# Patient Record
Sex: FEMALE | Race: WHITE | NOT HISPANIC OR LATINO | Employment: UNEMPLOYED | URBAN - METROPOLITAN AREA
[De-identification: names, ages, dates, MRNs, and addresses within clinical notes are randomized per-mention and may not be internally consistent; named-entity substitution may affect disease eponyms.]

---

## 2022-11-18 ENCOUNTER — OFFICE VISIT (OUTPATIENT)
Dept: FAMILY MEDICINE | Facility: CLINIC | Age: 32
End: 2022-11-18
Payer: COMMERCIAL

## 2022-11-18 VITALS
BODY MASS INDEX: 20.25 KG/M2 | DIASTOLIC BLOOD PRESSURE: 74 MMHG | TEMPERATURE: 98.2 F | HEIGHT: 67 IN | HEART RATE: 80 BPM | SYSTOLIC BLOOD PRESSURE: 104 MMHG | WEIGHT: 129 LBS

## 2022-11-18 DIAGNOSIS — R10.32 LEFT GROIN PAIN: ICD-10-CM

## 2022-11-18 DIAGNOSIS — F43.9 STRESS: ICD-10-CM

## 2022-11-18 DIAGNOSIS — R53.83 TIRED: ICD-10-CM

## 2022-11-18 DIAGNOSIS — R10.2 PELVIC PAIN: Primary | ICD-10-CM

## 2022-11-18 DIAGNOSIS — K64.9 HEMORRHOIDS, UNSPECIFIED HEMORRHOID TYPE: ICD-10-CM

## 2022-11-18 LAB
BASOPHILS # BLD AUTO: 0 10E3/UL (ref 0–0.2)
BASOPHILS NFR BLD AUTO: 0 %
CLUE CELLS: ABNORMAL
EOSINOPHIL # BLD AUTO: 0.1 10E3/UL (ref 0–0.7)
EOSINOPHIL NFR BLD AUTO: 2 %
ERYTHROCYTE [DISTWIDTH] IN BLOOD BY AUTOMATED COUNT: 12.1 % (ref 10–15)
HCT VFR BLD AUTO: 39 % (ref 35–47)
HGB BLD-MCNC: 12.9 G/DL (ref 11.7–15.7)
IMM GRANULOCYTES # BLD: 0 10E3/UL
IMM GRANULOCYTES NFR BLD: 0 %
LYMPHOCYTES # BLD AUTO: 2.2 10E3/UL (ref 0.8–5.3)
LYMPHOCYTES NFR BLD AUTO: 32 %
MCH RBC QN AUTO: 29.1 PG (ref 26.5–33)
MCHC RBC AUTO-ENTMCNC: 33.1 G/DL (ref 31.5–36.5)
MCV RBC AUTO: 88 FL (ref 78–100)
MONOCYTES # BLD AUTO: 0.8 10E3/UL (ref 0–1.3)
MONOCYTES NFR BLD AUTO: 11 %
NEUTROPHILS # BLD AUTO: 3.7 10E3/UL (ref 1.6–8.3)
NEUTROPHILS NFR BLD AUTO: 54 %
PLATELET # BLD AUTO: 239 10E3/UL (ref 150–450)
RBC # BLD AUTO: 4.44 10E6/UL (ref 3.8–5.2)
TRICHOMONAS, WET PREP: ABNORMAL
TSH SERPL DL<=0.005 MIU/L-ACNC: 0.66 UIU/ML (ref 0.3–4.2)
WBC # BLD AUTO: 6.9 10E3/UL (ref 4–11)
WBC'S/HIGH POWER FIELD, WET PREP: ABNORMAL
YEAST, WET PREP: ABNORMAL

## 2022-11-18 PROCEDURE — 85025 COMPLETE CBC W/AUTO DIFF WBC: CPT | Performed by: NURSE PRACTITIONER

## 2022-11-18 PROCEDURE — 99204 OFFICE O/P NEW MOD 45 MIN: CPT | Performed by: NURSE PRACTITIONER

## 2022-11-18 PROCEDURE — 36415 COLL VENOUS BLD VENIPUNCTURE: CPT | Performed by: NURSE PRACTITIONER

## 2022-11-18 PROCEDURE — 87210 SMEAR WET MOUNT SALINE/INK: CPT | Performed by: NURSE PRACTITIONER

## 2022-11-18 PROCEDURE — 84443 ASSAY THYROID STIM HORMONE: CPT | Performed by: NURSE PRACTITIONER

## 2022-11-18 ASSESSMENT — ENCOUNTER SYMPTOMS
HEADACHES: 1
ABDOMINAL PAIN: 0
DIARRHEA: 1
HEMATOCHEZIA: 1
HEMATURIA: 1

## 2022-11-18 NOTE — PROGRESS NOTES
SUBJECTIVE:   CC: Janie is an 32 year old who presents to [facility] care. She is present related to left pelvic pain, left groin pain, and hemorrhoids.  She stated that the pelvic pain has been present for at least 1 to 2 months.  She denied any abnormal vaginal bleeding, vaginal odor, fevers, chills, nausea or vomiting.  She stated that she has been under more stress because she recently moved from [facility].  She denied lifting any heavy objects but the pain can be reproduced by lifting up her left leg.  She denied any concerns for sexually transmitted diseases.  She is currently getting over her period.  She denied any pain of this type now or in the past.    -She stated that she does have a long history of hemorrhoids and they are very painful.  She has changed her diet to a vegetarian, eats plenty of fiber, and drinks water throughout the day.  She has tried creams and suppositories but still has a lot of pain from her hemorrhoids.  She stated that sometimes they can bleed.  She has difficulty sitting fully on her rectal area related to the pain at times.  She stated right now they are not inflamed.  She denied any drainage from these areas.  She denied any hard stools.  She denied any diarrhea. She would like them removed.  -She recently moved from [facility] and has been under more stress lately.   -She has been feeling tired and sometimes weak.  She would like to get a complete blood count to ensure that she does not have an infection or anemia.   -She denied any shortness of breath, chest pain or dizziness.  She denied any syncope.  She stated that she does eat on a regular basis.  She denied any urinary changes.      Healthy Habits:     Getting at least 3 servings of Calcium per day:  Yes    Bi-annual eye exam:  NO    Dental care twice a year:  NO    Sleep apnea or symptoms of sleep apnea:  None    Diet:  Vegetarian/vegan    Frequency of exercise:  2-3 days/week    Duration of exercise:  15-30 minutes     Taking medications regularly:  Yes    Medication side effects:  Not applicable    PHQ-2 Total Score: 0    Additional concerns today:  Yes    Hemorrhoid concerns. Difficult with BMs and sitting uncomfortable. Also L ovary concerns.       Today's PHQ-2 Score:   PHQ-2 ( 1999 [employer]) 11/18/2022   Q1: Little interest or pleasure in doing things 0   Q2: Feeling down, depressed or hopeless 0   PHQ-2 Score 0   Q1: Little interest or pleasure in doing things Not at all   Q2: Feeling down, depressed or hopeless Not at all   PHQ-2 Score 0       Have you ever done Advance Care Planning? (For example, a Health Directive, POLST, or a discussion with a medical provider or your loved ones about your wishes): No, advance care planning information given to patient to review.  Patient plans to discuss their wishes with loved ones or provider.      Social History     Tobacco Use     Smoking status: Never     Smokeless tobacco: Never   Substance Use Topics     Alcohol use: Not on file     If you drink alcohol do you typically have >3 drinks per day or >7 drinks per week? No    Alcohol Use 11/18/2022   Prescreen: >3 drinks/day or >7 drinks/week? Not Applicable   Prescreen: >3 drinks/day or >7 drinks/week? -       Reviewed orders with patient.  Reviewed health maintenance and updated orders accordingly - Yes  Lab work is in process  Labs reviewed in EPIC    Breast Cancer Screening:    Breast CA Risk Assessment (FHS-7) 11/18/2022   Do you have a family history of breast, colon, or ovarian cancer? No / Unknown         Patient under 40 years of age: Routine Mammogram Screening not recommended.   Pertinent mammograms are reviewed under the imaging tab.    History of abnormal Pap smear: NO - age 30-65 PAP every 5 years with negative HPV co-testing recommended     Reviewed and updated as needed this visit by clinical staff   Tobacco  Allergies  Meds              Reviewed and updated as needed this visit by Provider                 No past  "medical history on file.   No past surgical history on file.    Review of Systems   Constitutional: Positive for fatigue. Negative for activity change, chills, diaphoresis, fever and unexpected weight change.   HENT: Negative.    Respiratory: Negative.    Cardiovascular: Negative.    Gastrointestinal: Positive for constipation, diarrhea and rectal pain. Negative for abdominal distention, abdominal pain, hematochezia, nausea and vomiting.   Genitourinary: Positive for hematuria and pelvic pain. Negative for decreased urine volume, difficulty urinating, dysuria, flank pain, frequency, genital sores, urgency, vaginal bleeding, vaginal discharge and vaginal pain.   Musculoskeletal:        Left groin pain   Neurological: Positive for weakness and headaches.   Psychiatric/Behavioral: Negative for self-injury, sleep disturbance and suicidal ideas. The patient is nervous/anxious.         OBJECTIVE:   /74 (BP Location: Right arm, Patient Position: Sitting, Cuff Size: Adult Regular)   Pulse 80   Temp 98.2  F (36.8  C) (Temporal)   Ht 1.695 m (5' 6.75\")   Wt 58.5 kg (129 lb)   LMP 11/15/2022 (Approximate)   BMI 20.36 kg/m    Physical Exam  Vitals and nursing note reviewed.   Constitutional:       Appearance: Normal appearance. She is normal weight.   HENT:      Mouth/Throat:      Mouth: Mucous membranes are moist.   Cardiovascular:      Rate and Rhythm: Normal rate.   Pulmonary:      Effort: Pulmonary effort is normal.      Breath sounds: Normal breath sounds.   Abdominal:      General: Abdomen is flat. There is no distension.      Palpations: Abdomen is soft. There is no mass.      Tenderness: There is abdominal tenderness. There is no right CVA tenderness, left CVA tenderness, guarding or rebound.      Hernia: No hernia is present.      Comments: Left groin pain when lifting left leg, and when pressure is applied. Left pelvic pain as well; both similar areas. No mass, hernia, swelling noted.    Musculoskeletal:   "       General: Normal range of motion.      Cervical back: Normal range of motion.      Right lower leg: No edema.      Left lower leg: No edema.   Lymphadenopathy:      Cervical: No cervical adenopathy.   Skin:     General: Skin is warm and dry.      Capillary Refill: Capillary refill takes less than 2 seconds.   Neurological:      General: No focal deficit present.      Mental Status: She is alert.   Psychiatric:         Mood and Affect: Mood normal.         Behavior: Behavior normal.         Thought Content: Thought content normal.         Judgment: Judgment normal.         Diagnostic Test Results:  Labs reviewed in Epic    ASSESSMENT/PLAN:       ICD-10-CM    1. Pelvic pain  R10.2 Wet prep - Clinic Collect     US Pelvic Complete with Transvaginal     CBC with platelets and differential      2. Left groin pain  R10.32 CBC with platelets and differential     CBC with platelets and differential      3. Tired  R53.83 CBC with platelets and differential     TSH     CBC with platelets and differential     TSH      4. Hemorrhoids, unspecified hemorrhoid type  K64.9 Adult Colorectal Surgery  Referral      5. Stress  F43.9         - CBC is not indicating anemia or infection. Wet prep is negative. Symptoms on-going for months and seemed to have not worsen. I question groin strain, stress, or cyst. Not leaning towards ectopic since She has her period right now, and not sexually active. She is under stress with moving to [place]. She denied any heavy lifting. Not leaning towards appendix since pain is more in the groin area and CbC is normal and on-going for months without systematic symptoms. I discussed warning symptoms and when to follow up PRN.   US has been ordered to further assess if symptoms do not improve. Can use OTC medications and the purpose of those meds discussed. Can apply heat the area.   Tiredness. Not sure if related. CBC and TSH are normal ranges. Could be related to stress with move and  her spouse fighting in the war. Will continue to monitor. I discussed staying hydrated and eating meals without skipping. Her weight is on the lower side of normal.   Hemorrhoids noted on exam, thrombosed, without redness or drainage. I discussed conservative management and she really wants them removed. Side effects of removal discussed and she still would like to see someone to get them removed. Referral placed. She will continue current creams, sitz baths, diet, hydration, at home.     - I called her with the results.     Patient has been advised of split billing requirements and indicates understanding: Yes      COUNSELING:  Reviewed preventive health counseling, as reflected in patient instructions        She reports that she has never smoked. She has never used smokeless tobacco.          PARAM Barbosa [facility]

## 2022-11-21 ASSESSMENT — ENCOUNTER SYMPTOMS
FREQUENCY: 0
UNEXPECTED WEIGHT CHANGE: 0
CARDIOVASCULAR NEGATIVE: 1
WEAKNESS: 1
FATIGUE: 1
RECTAL PAIN: 1
RESPIRATORY NEGATIVE: 1
CONSTIPATION: 1
HEMATOCHEZIA: 0
DIAPHORESIS: 0
FLANK PAIN: 0
ACTIVITY CHANGE: 0
CHILLS: 0
DYSURIA: 0
NERVOUS/ANXIOUS: 1
SLEEP DISTURBANCE: 0
DIFFICULTY URINATING: 0
VOMITING: 0
FEVER: 0
ABDOMINAL DISTENTION: 0
NAUSEA: 0